# Patient Record
Sex: FEMALE | Race: WHITE | NOT HISPANIC OR LATINO
[De-identification: names, ages, dates, MRNs, and addresses within clinical notes are randomized per-mention and may not be internally consistent; named-entity substitution may affect disease eponyms.]

---

## 2019-09-04 PROBLEM — Z00.00 ENCOUNTER FOR PREVENTIVE HEALTH EXAMINATION: Status: ACTIVE | Noted: 2019-09-04

## 2019-09-07 ENCOUNTER — APPOINTMENT (OUTPATIENT)
Dept: ORTHOPEDIC SURGERY | Facility: CLINIC | Age: 79
End: 2019-09-07
Payer: MEDICARE

## 2019-09-07 VITALS
DIASTOLIC BLOOD PRESSURE: 88 MMHG | SYSTOLIC BLOOD PRESSURE: 155 MMHG | WEIGHT: 194 LBS | HEART RATE: 61 BPM | BODY MASS INDEX: 29.4 KG/M2 | HEIGHT: 68 IN

## 2019-09-07 DIAGNOSIS — Z86.39 PERSONAL HISTORY OF OTHER ENDOCRINE, NUTRITIONAL AND METABOLIC DISEASE: ICD-10-CM

## 2019-09-07 DIAGNOSIS — Z87.39 PERSONAL HISTORY OF OTHER DISEASES OF THE MUSCULOSKELETAL SYSTEM AND CONNECTIVE TISSUE: ICD-10-CM

## 2019-09-07 DIAGNOSIS — M17.12 UNILATERAL PRIMARY OSTEOARTHRITIS, LEFT KNEE: ICD-10-CM

## 2019-09-07 DIAGNOSIS — Z86.2 PERSONAL HISTORY OF DISEASES OF THE BLOOD AND BLOOD-FORMING ORGANS AND CERTAIN DISORDERS INVOLVING THE IMMUNE MECHANISM: ICD-10-CM

## 2019-09-07 DIAGNOSIS — M17.11 UNILATERAL PRIMARY OSTEOARTHRITIS, RIGHT KNEE: ICD-10-CM

## 2019-09-07 DIAGNOSIS — Z86.718 PERSONAL HISTORY OF OTHER VENOUS THROMBOSIS AND EMBOLISM: ICD-10-CM

## 2019-09-07 DIAGNOSIS — Z78.9 OTHER SPECIFIED HEALTH STATUS: ICD-10-CM

## 2019-09-07 DIAGNOSIS — Z86.79 PERSONAL HISTORY OF OTHER DISEASES OF THE CIRCULATORY SYSTEM: ICD-10-CM

## 2019-09-07 PROCEDURE — 73562 X-RAY EXAM OF KNEE 3: CPT | Mod: 50

## 2019-09-07 PROCEDURE — 99204 OFFICE O/P NEW MOD 45 MIN: CPT

## 2019-09-07 RX ORDER — LOTEPREDNOL ETABONATE 10 MG/ML
1 SUSPENSION TOPICAL
Qty: 3 | Refills: 0 | Status: ACTIVE | COMMUNITY
Start: 2019-04-30

## 2019-09-07 RX ORDER — BROMFENAC 0.76 MG/ML
0.07 SOLUTION/ DROPS OPHTHALMIC
Qty: 5 | Refills: 0 | Status: ACTIVE | COMMUNITY
Start: 2019-04-30

## 2019-09-07 RX ORDER — BESIFLOXACIN 6 MG/ML
0.6 SUSPENSION OPHTHALMIC
Qty: 5 | Refills: 0 | Status: ACTIVE | COMMUNITY
Start: 2019-04-30

## 2019-09-07 RX ORDER — OMEPRAZOLE 20 MG/1
20 CAPSULE, DELAYED RELEASE ORAL
Qty: 90 | Refills: 0 | Status: ACTIVE | COMMUNITY
Start: 2019-04-09

## 2019-09-07 RX ORDER — BUTALBITAL, ACETAMINOPHEN AND CAFFEINE 325; 50; 40 MG/1; MG/1; MG/1
50-325-40 TABLET ORAL
Qty: 25 | Refills: 0 | Status: ACTIVE | COMMUNITY
Start: 2019-08-20

## 2019-09-07 RX ORDER — ROSUVASTATIN CALCIUM 10 MG/1
10 TABLET, FILM COATED ORAL
Qty: 36 | Refills: 0 | Status: ACTIVE | COMMUNITY
Start: 2019-06-24

## 2019-09-07 RX ORDER — WARFARIN 7.5 MG/1
7.5 TABLET ORAL
Qty: 1 | Refills: 0 | Status: ACTIVE | COMMUNITY
Start: 2019-08-20

## 2019-09-07 RX ORDER — ROSUVASTATIN CALCIUM 5 MG/1
5 TABLET, FILM COATED ORAL
Qty: 36 | Refills: 0 | Status: ACTIVE | COMMUNITY
Start: 2019-05-23

## 2019-09-07 RX ORDER — METOPROLOL SUCCINATE 25 MG/1
25 TABLET, EXTENDED RELEASE ORAL
Qty: 180 | Refills: 0 | Status: ACTIVE | COMMUNITY
Start: 2019-03-15

## 2019-09-07 RX ORDER — ASPIRIN ENTERIC COATED TABLETS 81 MG 81 MG/1
81 TABLET, DELAYED RELEASE ORAL
Qty: 30 | Refills: 0 | Status: ACTIVE | COMMUNITY
Start: 2019-08-19

## 2019-09-07 RX ORDER — WARFARIN 6 MG/1
6 TABLET ORAL
Qty: 90 | Refills: 0 | Status: ACTIVE | COMMUNITY
Start: 2019-03-19

## 2019-09-07 RX ORDER — PNV NO.95/FERROUS FUM/FOLIC AC 28MG-0.8MG
TABLET ORAL
Refills: 0 | Status: ACTIVE | COMMUNITY

## 2019-09-07 RX ORDER — TROPICAMIDE 10 MG/ML
1 SOLUTION/ DROPS OPHTHALMIC
Qty: 15 | Refills: 0 | Status: ACTIVE | COMMUNITY
Start: 2019-05-28

## 2019-09-24 ENCOUNTER — OUTPATIENT (OUTPATIENT)
Dept: OUTPATIENT SERVICES | Facility: HOSPITAL | Age: 79
LOS: 1 days | End: 2019-09-24
Payer: MEDICARE

## 2019-09-24 VITALS
HEIGHT: 67 IN | DIASTOLIC BLOOD PRESSURE: 103 MMHG | RESPIRATION RATE: 18 BRPM | SYSTOLIC BLOOD PRESSURE: 134 MMHG | TEMPERATURE: 98 F | OXYGEN SATURATION: 98 % | HEART RATE: 85 BPM | WEIGHT: 194.01 LBS

## 2019-09-24 DIAGNOSIS — Z98.890 OTHER SPECIFIED POSTPROCEDURAL STATES: Chronic | ICD-10-CM

## 2019-09-24 DIAGNOSIS — M17.11 UNILATERAL PRIMARY OSTEOARTHRITIS, RIGHT KNEE: ICD-10-CM

## 2019-09-24 DIAGNOSIS — Z01.818 ENCOUNTER FOR OTHER PREPROCEDURAL EXAMINATION: ICD-10-CM

## 2019-09-24 LAB
ALBUMIN SERPL ELPH-MCNC: 3.8 G/DL — SIGNIFICANT CHANGE UP (ref 3.3–5)
ALP SERPL-CCNC: 65 U/L — SIGNIFICANT CHANGE UP (ref 30–120)
ALT FLD-CCNC: 37 U/L DA — SIGNIFICANT CHANGE UP (ref 10–60)
ANION GAP SERPL CALC-SCNC: 5 MMOL/L — SIGNIFICANT CHANGE UP (ref 5–17)
APTT BLD: 48.5 SEC — HIGH (ref 28.5–37)
AST SERPL-CCNC: 33 U/L — SIGNIFICANT CHANGE UP (ref 10–40)
BILIRUB SERPL-MCNC: 0.4 MG/DL — SIGNIFICANT CHANGE UP (ref 0.2–1.2)
BLD GP AB SCN SERPL QL: SIGNIFICANT CHANGE UP
BUN SERPL-MCNC: 16 MG/DL — SIGNIFICANT CHANGE UP (ref 7–23)
CALCIUM SERPL-MCNC: 10 MG/DL — SIGNIFICANT CHANGE UP (ref 8.4–10.5)
CHLORIDE SERPL-SCNC: 104 MMOL/L — SIGNIFICANT CHANGE UP (ref 96–108)
CO2 SERPL-SCNC: 29 MMOL/L — SIGNIFICANT CHANGE UP (ref 22–31)
CREAT SERPL-MCNC: 1.07 MG/DL — SIGNIFICANT CHANGE UP (ref 0.5–1.3)
GLUCOSE SERPL-MCNC: 114 MG/DL — HIGH (ref 70–99)
HCT VFR BLD CALC: 44.5 % — SIGNIFICANT CHANGE UP (ref 34.5–45)
HGB BLD-MCNC: 14.7 G/DL — SIGNIFICANT CHANGE UP (ref 11.5–15.5)
INR BLD: 2.54 RATIO — HIGH (ref 0.88–1.16)
MCHC RBC-ENTMCNC: 31.1 PG — SIGNIFICANT CHANGE UP (ref 27–34)
MCHC RBC-ENTMCNC: 33 GM/DL — SIGNIFICANT CHANGE UP (ref 32–36)
MCV RBC AUTO: 94.1 FL — SIGNIFICANT CHANGE UP (ref 80–100)
MRSA PCR RESULT.: SIGNIFICANT CHANGE UP
NRBC # BLD: 0 /100 WBCS — SIGNIFICANT CHANGE UP (ref 0–0)
PLATELET # BLD AUTO: 255 K/UL — SIGNIFICANT CHANGE UP (ref 150–400)
POTASSIUM SERPL-MCNC: 4.3 MMOL/L — SIGNIFICANT CHANGE UP (ref 3.5–5.3)
POTASSIUM SERPL-SCNC: 4.3 MMOL/L — SIGNIFICANT CHANGE UP (ref 3.5–5.3)
PROT SERPL-MCNC: 8 G/DL — SIGNIFICANT CHANGE UP (ref 6–8.3)
PROTHROM AB SERPL-ACNC: 28.5 SEC — HIGH (ref 10–12.9)
RBC # BLD: 4.73 M/UL — SIGNIFICANT CHANGE UP (ref 3.8–5.2)
RBC # FLD: 14.2 % — SIGNIFICANT CHANGE UP (ref 10.3–14.5)
S AUREUS DNA NOSE QL NAA+PROBE: SIGNIFICANT CHANGE UP
SODIUM SERPL-SCNC: 138 MMOL/L — SIGNIFICANT CHANGE UP (ref 135–145)
WBC # BLD: 4.67 K/UL — SIGNIFICANT CHANGE UP (ref 3.8–10.5)
WBC # FLD AUTO: 4.67 K/UL — SIGNIFICANT CHANGE UP (ref 3.8–10.5)

## 2019-09-24 PROCEDURE — 93005 ELECTROCARDIOGRAM TRACING: CPT

## 2019-09-24 PROCEDURE — 80053 COMPREHEN METABOLIC PANEL: CPT

## 2019-09-24 PROCEDURE — 85027 COMPLETE CBC AUTOMATED: CPT

## 2019-09-24 PROCEDURE — 85610 PROTHROMBIN TIME: CPT

## 2019-09-24 PROCEDURE — G0463: CPT

## 2019-09-24 PROCEDURE — 36415 COLL VENOUS BLD VENIPUNCTURE: CPT

## 2019-09-24 PROCEDURE — 85730 THROMBOPLASTIN TIME PARTIAL: CPT

## 2019-09-24 PROCEDURE — 87640 STAPH A DNA AMP PROBE: CPT

## 2019-09-24 PROCEDURE — 86850 RBC ANTIBODY SCREEN: CPT

## 2019-09-24 PROCEDURE — 93010 ELECTROCARDIOGRAM REPORT: CPT

## 2019-09-24 PROCEDURE — 87641 MR-STAPH DNA AMP PROBE: CPT

## 2019-09-24 PROCEDURE — 86900 BLOOD TYPING SEROLOGIC ABO: CPT

## 2019-09-24 PROCEDURE — 86901 BLOOD TYPING SEROLOGIC RH(D): CPT

## 2019-09-24 NOTE — H&P PST ADULT - NSICDXPROBLEM_GEN_ALL_CORE_FT
PROBLEM DIAGNOSES  Problem: Arthritis of right knee  Assessment and Plan: right total knee replacement  10/14/19  medical clearance/hematology clearance/vascular clearance  patient to stop coumadin as per  hematologist

## 2019-09-24 NOTE — H&P PST ADULT - NSICDXFAMILYHX_GEN_ALL_CORE_FT
FAMILY HISTORY:  Family history of malignant melanoma of skin, mother  Family history of seizures, father

## 2019-09-24 NOTE — H&P PST ADULT - NSICDXPASTMEDICALHX_GEN_ALL_CORE_FT
PAST MEDICAL HISTORY:  Breast cancer, female s/p lumpectomy chemo and radiation    High cholesterol     History of DVT of lower extremity RLE    History of hay fever     History of pulmonary embolism 2016    Hypertension

## 2019-09-24 NOTE — H&P PST ADULT - HISTORY OF PRESENT ILLNESS
78 y/o female with right knee pain for more than a year. Failed conservative therapy Progressively getting worst with stiffness and pain with ADLs. Takes tylenol as needed with some relief

## 2019-09-24 NOTE — H&P PST ADULT - MUSCULOSKELETAL
No joint pain, swelling or deformity; no limitation of movement details… detailed exam right knee/decreased ROM due to pain

## 2019-09-27 ENCOUNTER — OUTPATIENT (OUTPATIENT)
Dept: OUTPATIENT SERVICES | Facility: HOSPITAL | Age: 79
LOS: 1 days | Discharge: HOME | End: 2019-09-27

## 2019-09-27 DIAGNOSIS — I26.99 OTHER PULMONARY EMBOLISM WITHOUT ACUTE COR PULMONALE: ICD-10-CM

## 2019-09-27 DIAGNOSIS — Z98.890 OTHER SPECIFIED POSTPROCEDURAL STATES: Chronic | ICD-10-CM

## 2019-09-28 PROBLEM — Z86.718 PERSONAL HISTORY OF OTHER VENOUS THROMBOSIS AND EMBOLISM: Chronic | Status: ACTIVE | Noted: 2019-09-24

## 2019-09-28 PROBLEM — I10 ESSENTIAL (PRIMARY) HYPERTENSION: Chronic | Status: ACTIVE | Noted: 2019-09-24

## 2019-09-28 PROBLEM — Z86.711 PERSONAL HISTORY OF PULMONARY EMBOLISM: Chronic | Status: ACTIVE | Noted: 2019-09-24

## 2019-09-28 PROBLEM — C50.919 MALIGNANT NEOPLASM OF UNSPECIFIED SITE OF UNSPECIFIED FEMALE BREAST: Chronic | Status: ACTIVE | Noted: 2019-09-24

## 2019-09-28 PROBLEM — E78.00 PURE HYPERCHOLESTEROLEMIA, UNSPECIFIED: Chronic | Status: ACTIVE | Noted: 2019-09-24

## 2019-09-28 PROBLEM — Z87.09 PERSONAL HISTORY OF OTHER DISEASES OF THE RESPIRATORY SYSTEM: Chronic | Status: ACTIVE | Noted: 2019-09-24

## 2019-10-02 ENCOUNTER — APPOINTMENT (OUTPATIENT)
Dept: HEMATOLOGY ONCOLOGY | Facility: CLINIC | Age: 79
End: 2019-10-02
Payer: MEDICARE

## 2019-10-02 VITALS
TEMPERATURE: 97.9 F | WEIGHT: 194.89 LBS | HEIGHT: 68 IN | OXYGEN SATURATION: 97 % | DIASTOLIC BLOOD PRESSURE: 96 MMHG | SYSTOLIC BLOOD PRESSURE: 150 MMHG | RESPIRATION RATE: 18 BRPM | BODY MASS INDEX: 29.54 KG/M2 | HEART RATE: 62 BPM

## 2019-10-02 DIAGNOSIS — Z85.3 PERSONAL HISTORY OF MALIGNANT NEOPLASM OF BREAST: ICD-10-CM

## 2019-10-02 DIAGNOSIS — Z85.9 PERSONAL HISTORY OF MALIGNANT NEOPLASM, UNSPECIFIED: ICD-10-CM

## 2019-10-02 DIAGNOSIS — Z80.9 FAMILY HISTORY OF MALIGNANT NEOPLASM, UNSPECIFIED: ICD-10-CM

## 2019-10-02 DIAGNOSIS — Z92.89 PERSONAL HISTORY OF OTHER MEDICAL TREATMENT: ICD-10-CM

## 2019-10-02 DIAGNOSIS — Z86.69 PERSONAL HISTORY OF OTHER DISEASES OF THE NERVOUS SYSTEM AND SENSE ORGANS: ICD-10-CM

## 2019-10-02 DIAGNOSIS — I82.401 ACUTE EMBOLISM AND THROMBOSIS OF UNSPECIFIED DEEP VEINS OF RIGHT LOWER EXTREMITY: ICD-10-CM

## 2019-10-02 DIAGNOSIS — Z86.711 PERSONAL HISTORY OF PULMONARY EMBOLISM: ICD-10-CM

## 2019-10-02 PROCEDURE — 99205 OFFICE O/P NEW HI 60 MIN: CPT

## 2019-10-02 NOTE — REASON FOR VISIT
[Initial Consultation] : an initial consultation for [Other: _____] : [unfilled] [FreeTextEntry2] : h/o DVT/PE

## 2019-10-02 NOTE — REVIEW OF SYSTEMS
[Patient Intake Form Reviewed] : Patient intake form was reviewed [Joint Pain] : joint pain [Negative] : Allergic/Immunologic

## 2019-10-02 NOTE — HISTORY OF PRESENT ILLNESS
[de-identified] : 2/2015-Had RLE DVT/PE-unprovoked-treated with Coumadin since that time. Sees pulmonologist every 3 months (Dr. Becerra) who monitors her Coumadin.\rosmery Saw Dr. Galloway (vascular) this past week, and had LE US which was reportedly negative for DVT.\rosmery Has plans for right total knee replacement 10/14/19.\par No complaints of chest pain, shortness of breath, calf pain.\par Sees oncologist Dr. Campbell ([place]) yearly for h/o breast cancer.\par Father has h/o pulmonary embolism postoperatively.

## 2019-10-02 NOTE — CONSULT LETTER
[Dear  ___] : Dear  [unfilled], [Consult Letter:] : I had the pleasure of evaluating your patient, [unfilled]. [Please see my note below.] : Please see my note below. [Consult Closing:] : Thank you very much for allowing me to participate in the care of this patient.  If you have any questions, please do not hesitate to contact me. [Sincerely,] : Sincerely, [DrMayank  ___] : Dr. GRIFFITH [DrMayank ___] : Dr. GRIFFITH [FreeTextEntry3] : Jeri Barbosa M.D.

## 2019-10-02 NOTE — ASSESSMENT
[FreeTextEntry1] : Patient with history of seemingly unprovoked DVT/pulmonary embolism years ago, maintained on anticoagulation with Coumadin.Have asked for report of recent lower extremity ultrasound(Dr. Galloway).\par Discussed hypercoagulable screening evaluation to further define her thrombotic risks. Patient declines pursuing hypercoagulable evaluation at this time. She stated she is agreeable to continue indefinite anticoagulation at this point. Potential bleeding risk/alternatives of anticoagulation reviewed.\par Patient will hold her Coumadin prior to knee surgery. Following surgery, once cleared by surgeon to resume anticoagulation, recommend bridging of low molecular weight heparin to Coumadin; and as early ambulation as possible.\par Patient was given the opportunity to ask questions. Her questions have been answered to her apparent satisfaction at this time.

## 2019-10-08 ENCOUNTER — OTHER (OUTPATIENT)
Age: 79
End: 2019-10-08

## 2019-10-10 RX ORDER — WARFARIN SODIUM 2.5 MG/1
1 TABLET ORAL
Qty: 0 | Refills: 0 | DISCHARGE

## 2019-10-10 NOTE — PROGRESS NOTE ADULT - SUBJECTIVE AND OBJECTIVE BOX
Admission medication reconciliation/Presurgical evaluation:    Allergies:  Floxin: Hives, Rash  Cipro: rash, possibly photosensitivity  Biaxin: lip swelling  sulfa drugs: doesn’t remember    Intolerance:  Percocet: nausea/vomiting    Home Medications:   · 	rosuvastatin 10 mg once a day  · 	metoprolol tartrate 25 mg 2 times a day  · 	warfarin 6 mg once a day, 6 days per week (no Sat)  · 	omeprazole 20 mg once a day  · 	loratadine 10 mg once a day  · 	Tylenol 1000 mg every 6 hours, As Needed  · 	fluticasone 0.5 mg/2 mL inhalation suspension PRN   ·	Co Q 10 Qday    PAST MEDICAL HISTORY:  Breast cancer, female s/p lumpectomy chemo and radiation  High cholesterol   History of DVT of lower extremity RLE  History of hay fever   History of pulmonary embolism 2015  Hypertension    PAST SURGICAL HISTORY:  S/P lumpectomy, right breast.     FAMILY HISTORY:  Father, fatal PE after surgery    PST values of interest:  PT/INR 28.5/2.54 (­Þ warfarin)  SCr 1.07 mg/dL  LFTs WNL

## 2019-10-10 NOTE — PROGRESS NOTE ADULT - ASSESSMENT
Presurgical evaluation:  1.	IV TXA OK per protocol; clinician decision if topical TXA instead  2.	I spoke to the patient’s pulmonologist (Hernan) and hematologist (Chelsey). Patient will interrupt warfarin 4 days prior to surgery. No Enoxaparin bridge preop.   3.	Acetaminophen 1G IV q6h x4 then Acetaminophen 1G PO q12h (concurrent warfarin). Avoid Meloxicam due to Enoxaparin/warfarin bridge postop.  4.	Per hematology: Enoxaparin 30mg q12h POD1 then Enoxaparin 1mg/kg q12h (treatment dose) with warfarin, titrate to therapeutic INR.  5.	Restart Warfarin on POD1 after patient is bridged with Enoxaparin  6.	Please confirm that the patient does NOT take ASA 81mg Qday chronically. It is listed on the only presurgical clearance I have in hand.  7.	Patient would prefer to try Tramadol for PRN breakthrough pain. Percocet has caused N/V in past. Presurgical evaluation:  1.	IV TXA OK per protocol; clinician decision if topical TXA instead  2.	I spoke to the patient’s pulmonologist (Hernan) and hematologist (Chelsey). Patient will interrupt warfarin 4 days prior to surgery. No Enoxaparin bridge preop.   3.	Acetaminophen 1G IV q6h x4 then Acetaminophen 1G PO q12h (concurrent warfarin). Avoid Meloxicam due to Enoxaparin/warfarin bridge postop.  4.	Per hematology: Enoxaparin 30mg q12h POD1 then Enoxaparin 1mg/kg q12h (treatment dose) with warfarin, titrate to therapeutic INR.  5.	Restart Warfarin on POD1 after patient is bridged with Enoxaparin  6.	Please confirm that the patient does NOT take ASA 81mg Qday chronically. It is listed on the only presurgical clearance I have in hand.  7.	Percocet has caused nausea/vomiting in this patient. She requested to try tramadol first but there is a significant drug interaction with warfarin that can cause supratherapeutic INR. Discuss either low dose oxycodone or hydromorphone with patient instead.

## 2019-10-11 ENCOUNTER — CHART COPY (OUTPATIENT)
Age: 79
End: 2019-10-11

## 2019-10-11 RX ORDER — ENOXAPARIN SODIUM 100 MG/ML
40 INJECTION SUBCUTANEOUS
Qty: 10 | Refills: 0 | Status: ACTIVE | COMMUNITY
Start: 2019-10-11 | End: 1900-01-01

## 2019-10-11 NOTE — CHART NOTE - NSCHARTNOTEFT_GEN_A_CORE
Asked to review clearances by Anesthesia, I spoke to both Saud Galloway and Nadja regarding anticoagulation.  Due to personal and family history patient is at increased risk of developing clot but as of date of pre-op doppler had no DVT.  Therefore it is reasonable to provide pre-op prophylactic lovenox bridging (40mg subcutaneous daily today, Saturday and Sunday).   Post operatively can have prophylactic dose lovenox on POD #1 and Full treatment dose lovenox on POD #2 until INR therapeutic.    Surgeon's office notified and will be calling patient to arrange.

## 2019-10-14 ENCOUNTER — RESULT REVIEW (OUTPATIENT)
Age: 79
End: 2019-10-14

## 2019-10-14 ENCOUNTER — APPOINTMENT (OUTPATIENT)
Dept: ORTHOPEDIC SURGERY | Facility: HOSPITAL | Age: 79
End: 2019-10-14

## 2019-10-14 ENCOUNTER — INPATIENT (INPATIENT)
Facility: HOSPITAL | Age: 79
LOS: 1 days | Discharge: NURSING, REHAB OR LONG-TERM CARE | DRG: 470 | End: 2019-10-16
Attending: ORTHOPAEDIC SURGERY | Admitting: ORTHOPAEDIC SURGERY
Payer: MEDICARE

## 2019-10-14 VITALS
RESPIRATION RATE: 15 BRPM | SYSTOLIC BLOOD PRESSURE: 176 MMHG | OXYGEN SATURATION: 99 % | HEIGHT: 68 IN | HEART RATE: 71 BPM | DIASTOLIC BLOOD PRESSURE: 96 MMHG | WEIGHT: 190.26 LBS | TEMPERATURE: 98 F

## 2019-10-14 DIAGNOSIS — M17.11 UNILATERAL PRIMARY OSTEOARTHRITIS, RIGHT KNEE: ICD-10-CM

## 2019-10-14 DIAGNOSIS — Z98.890 OTHER SPECIFIED POSTPROCEDURAL STATES: Chronic | ICD-10-CM

## 2019-10-14 LAB
ANION GAP SERPL CALC-SCNC: 7 MMOL/L — SIGNIFICANT CHANGE UP (ref 5–17)
APTT BLD: 30.8 SEC — SIGNIFICANT CHANGE UP (ref 28.5–37)
BUN SERPL-MCNC: 14 MG/DL — SIGNIFICANT CHANGE UP (ref 7–23)
CALCIUM SERPL-MCNC: 9.1 MG/DL — SIGNIFICANT CHANGE UP (ref 8.4–10.5)
CHLORIDE SERPL-SCNC: 102 MMOL/L — SIGNIFICANT CHANGE UP (ref 96–108)
CO2 SERPL-SCNC: 27 MMOL/L — SIGNIFICANT CHANGE UP (ref 22–31)
CREAT SERPL-MCNC: 0.92 MG/DL — SIGNIFICANT CHANGE UP (ref 0.5–1.3)
GLUCOSE SERPL-MCNC: 140 MG/DL — HIGH (ref 70–99)
HCT VFR BLD CALC: 40.1 % — SIGNIFICANT CHANGE UP (ref 34.5–45)
HGB BLD-MCNC: 13.1 G/DL — SIGNIFICANT CHANGE UP (ref 11.5–15.5)
INR BLD: 1.07 RATIO — SIGNIFICANT CHANGE UP (ref 0.88–1.16)
POTASSIUM SERPL-MCNC: 3.9 MMOL/L — SIGNIFICANT CHANGE UP (ref 3.5–5.3)
POTASSIUM SERPL-SCNC: 3.9 MMOL/L — SIGNIFICANT CHANGE UP (ref 3.5–5.3)
PROTHROM AB SERPL-ACNC: 11.7 SEC — SIGNIFICANT CHANGE UP (ref 10–12.9)
SODIUM SERPL-SCNC: 136 MMOL/L — SIGNIFICANT CHANGE UP (ref 135–145)

## 2019-10-14 PROCEDURE — 99223 1ST HOSP IP/OBS HIGH 75: CPT

## 2019-10-14 PROCEDURE — 88305 TISSUE EXAM BY PATHOLOGIST: CPT | Mod: 26

## 2019-10-14 PROCEDURE — 88311 DECALCIFY TISSUE: CPT | Mod: 26

## 2019-10-14 PROCEDURE — 27447 TOTAL KNEE ARTHROPLASTY: CPT | Mod: RT

## 2019-10-14 PROCEDURE — 99497 ADVNCD CARE PLAN 30 MIN: CPT | Mod: 25

## 2019-10-14 RX ORDER — LORATADINE 10 MG/1
10 TABLET ORAL DAILY
Refills: 0 | Status: DISCONTINUED | OUTPATIENT
Start: 2019-10-14 | End: 2019-10-16

## 2019-10-14 RX ORDER — HYDROMORPHONE HYDROCHLORIDE 2 MG/ML
2 INJECTION INTRAMUSCULAR; INTRAVENOUS; SUBCUTANEOUS
Refills: 0 | Status: DISCONTINUED | OUTPATIENT
Start: 2019-10-14 | End: 2019-10-16

## 2019-10-14 RX ORDER — LORATADINE 10 MG/1
1 TABLET ORAL
Qty: 0 | Refills: 0 | DISCHARGE

## 2019-10-14 RX ORDER — FLUTICASONE PROPIONATE 220 MCG
0 AEROSOL WITH ADAPTER (GRAM) INHALATION
Qty: 0 | Refills: 0 | DISCHARGE

## 2019-10-14 RX ORDER — ENOXAPARIN SODIUM 100 MG/ML
30 INJECTION SUBCUTANEOUS EVERY 12 HOURS
Refills: 0 | Status: COMPLETED | OUTPATIENT
Start: 2019-10-15 | End: 2019-10-15

## 2019-10-14 RX ORDER — ONDANSETRON 8 MG/1
4 TABLET, FILM COATED ORAL ONCE
Refills: 0 | Status: DISCONTINUED | OUTPATIENT
Start: 2019-10-14 | End: 2019-10-14

## 2019-10-14 RX ORDER — ENOXAPARIN SODIUM 100 MG/ML
0 INJECTION SUBCUTANEOUS
Qty: 0 | Refills: 0 | DISCHARGE
Start: 2019-10-14

## 2019-10-14 RX ORDER — POLYETHYLENE GLYCOL 3350 17 G/17G
17 POWDER, FOR SOLUTION ORAL DAILY
Refills: 0 | Status: DISCONTINUED | OUTPATIENT
Start: 2019-10-14 | End: 2019-10-16

## 2019-10-14 RX ORDER — CHLORHEXIDINE GLUCONATE 213 G/1000ML
1 SOLUTION TOPICAL ONCE
Refills: 0 | Status: COMPLETED | OUTPATIENT
Start: 2019-10-14 | End: 2019-10-14

## 2019-10-14 RX ORDER — ACETAMINOPHEN 500 MG
1000 TABLET ORAL ONCE
Refills: 0 | Status: COMPLETED | OUTPATIENT
Start: 2019-10-14 | End: 2019-10-14

## 2019-10-14 RX ORDER — HYDROMORPHONE HYDROCHLORIDE 2 MG/ML
4 INJECTION INTRAMUSCULAR; INTRAVENOUS; SUBCUTANEOUS
Refills: 0 | Status: DISCONTINUED | OUTPATIENT
Start: 2019-10-14 | End: 2019-10-16

## 2019-10-14 RX ORDER — ACETAMINOPHEN 500 MG
1000 TABLET ORAL EVERY 6 HOURS
Refills: 0 | Status: COMPLETED | OUTPATIENT
Start: 2019-10-14 | End: 2019-10-15

## 2019-10-14 RX ORDER — SENNA PLUS 8.6 MG/1
2 TABLET ORAL AT BEDTIME
Refills: 0 | Status: DISCONTINUED | OUTPATIENT
Start: 2019-10-14 | End: 2019-10-16

## 2019-10-14 RX ORDER — DOCUSATE SODIUM 100 MG
100 CAPSULE ORAL THREE TIMES A DAY
Refills: 0 | Status: DISCONTINUED | OUTPATIENT
Start: 2019-10-14 | End: 2019-10-16

## 2019-10-14 RX ORDER — ATORVASTATIN CALCIUM 80 MG/1
40 TABLET, FILM COATED ORAL AT BEDTIME
Refills: 0 | Status: DISCONTINUED | OUTPATIENT
Start: 2019-10-14 | End: 2019-10-16

## 2019-10-14 RX ORDER — WARFARIN SODIUM 2.5 MG/1
6 TABLET ORAL ONCE
Refills: 0 | Status: COMPLETED | OUTPATIENT
Start: 2019-10-15 | End: 2019-10-15

## 2019-10-14 RX ORDER — OXYCODONE HYDROCHLORIDE 5 MG/1
5 TABLET ORAL ONCE
Refills: 0 | Status: DISCONTINUED | OUTPATIENT
Start: 2019-10-14 | End: 2019-10-14

## 2019-10-14 RX ORDER — WARFARIN SODIUM 2.5 MG/1
1 TABLET ORAL
Qty: 0 | Refills: 0 | DISCHARGE

## 2019-10-14 RX ORDER — CEFAZOLIN SODIUM 1 G
2000 VIAL (EA) INJECTION ONCE
Refills: 0 | Status: COMPLETED | OUTPATIENT
Start: 2019-10-14 | End: 2019-10-14

## 2019-10-14 RX ORDER — HYDROMORPHONE HYDROCHLORIDE 2 MG/ML
0.5 INJECTION INTRAMUSCULAR; INTRAVENOUS; SUBCUTANEOUS
Refills: 0 | Status: DISCONTINUED | OUTPATIENT
Start: 2019-10-14 | End: 2019-10-16

## 2019-10-14 RX ORDER — PANTOPRAZOLE SODIUM 20 MG/1
40 TABLET, DELAYED RELEASE ORAL
Refills: 0 | Status: DISCONTINUED | OUTPATIENT
Start: 2019-10-14 | End: 2019-10-16

## 2019-10-14 RX ORDER — PANTOPRAZOLE SODIUM 20 MG/1
40 TABLET, DELAYED RELEASE ORAL
Refills: 0 | Status: DISCONTINUED | OUTPATIENT
Start: 2019-10-14 | End: 2019-10-14

## 2019-10-14 RX ORDER — ACETAMINOPHEN 500 MG
1000 TABLET ORAL EVERY 12 HOURS
Refills: 0 | Status: DISCONTINUED | OUTPATIENT
Start: 2019-10-15 | End: 2019-10-16

## 2019-10-14 RX ORDER — APREPITANT 80 MG/1
40 CAPSULE ORAL ONCE
Refills: 0 | Status: COMPLETED | OUTPATIENT
Start: 2019-10-14 | End: 2019-10-14

## 2019-10-14 RX ORDER — ONDANSETRON 8 MG/1
4 TABLET, FILM COATED ORAL EVERY 6 HOURS
Refills: 0 | Status: DISCONTINUED | OUTPATIENT
Start: 2019-10-14 | End: 2019-10-16

## 2019-10-14 RX ORDER — METOPROLOL TARTRATE 50 MG
1 TABLET ORAL
Qty: 0 | Refills: 0 | DISCHARGE

## 2019-10-14 RX ORDER — ROSUVASTATIN CALCIUM 5 MG/1
1 TABLET ORAL
Qty: 0 | Refills: 0 | DISCHARGE

## 2019-10-14 RX ORDER — WARFARIN SODIUM 2.5 MG/1
6 TABLET ORAL ONCE
Refills: 0 | Status: COMPLETED | OUTPATIENT
Start: 2019-10-14 | End: 2019-10-14

## 2019-10-14 RX ORDER — HYDROMORPHONE HYDROCHLORIDE 2 MG/ML
0.5 INJECTION INTRAMUSCULAR; INTRAVENOUS; SUBCUTANEOUS
Refills: 0 | Status: DISCONTINUED | OUTPATIENT
Start: 2019-10-14 | End: 2019-10-14

## 2019-10-14 RX ORDER — METOPROLOL TARTRATE 50 MG
25 TABLET ORAL
Refills: 0 | Status: DISCONTINUED | OUTPATIENT
Start: 2019-10-14 | End: 2019-10-16

## 2019-10-14 RX ORDER — MAGNESIUM HYDROXIDE 400 MG/1
30 TABLET, CHEWABLE ORAL DAILY
Refills: 0 | Status: DISCONTINUED | OUTPATIENT
Start: 2019-10-14 | End: 2019-10-16

## 2019-10-14 RX ORDER — CEFAZOLIN SODIUM 1 G
2000 VIAL (EA) INJECTION EVERY 8 HOURS
Refills: 0 | Status: COMPLETED | OUTPATIENT
Start: 2019-10-14 | End: 2019-10-15

## 2019-10-14 RX ORDER — SODIUM CHLORIDE 9 MG/ML
1000 INJECTION, SOLUTION INTRAVENOUS
Refills: 0 | Status: DISCONTINUED | OUTPATIENT
Start: 2019-10-14 | End: 2019-10-16

## 2019-10-14 RX ADMIN — APREPITANT 40 MILLIGRAM(S): 80 CAPSULE ORAL at 11:00

## 2019-10-14 RX ADMIN — Medication 400 MILLIGRAM(S): at 20:10

## 2019-10-14 RX ADMIN — WARFARIN SODIUM 6 MILLIGRAM(S): 2.5 TABLET ORAL at 21:45

## 2019-10-14 RX ADMIN — Medication 100 MILLIGRAM(S): at 21:45

## 2019-10-14 RX ADMIN — Medication 100 MILLIGRAM(S): at 21:47

## 2019-10-14 RX ADMIN — HYDROMORPHONE HYDROCHLORIDE 0.5 MILLIGRAM(S): 2 INJECTION INTRAMUSCULAR; INTRAVENOUS; SUBCUTANEOUS at 17:04

## 2019-10-14 RX ADMIN — CHLORHEXIDINE GLUCONATE 1 APPLICATION(S): 213 SOLUTION TOPICAL at 11:00

## 2019-10-14 RX ADMIN — ATORVASTATIN CALCIUM 40 MILLIGRAM(S): 80 TABLET, FILM COATED ORAL at 21:47

## 2019-10-14 RX ADMIN — SENNA PLUS 2 TABLET(S): 8.6 TABLET ORAL at 21:47

## 2019-10-14 RX ADMIN — Medication 1000 MILLIGRAM(S): at 20:10

## 2019-10-14 NOTE — CONSULT NOTE ADULT - SUBJECTIVE AND OBJECTIVE BOX
80 yo F with hx of Breast cancer, female s/p lumpectomy chemo and radiation, dyslipidemia, DVT of RLE, PE in 2015,HTN, admitted to  for surgical evaluation of right knee. Now underwent right Total Knee replacement      Baseline Hx      Perioperative: Uneventful. estimated bloss loss 150cc        PAST MEDICAL & SURGICAL HISTORY:  History of DVT of lower extremity: RLE  History of pulmonary embolism: 2016  Breast cancer, female: s/p lumpectomy chemo and radiation  History of hay fever  High cholesterol  Hypertension  S/P lumpectomy, right breast      FAMILY HISTORY:  Family history of seizures: father  Family history of malignant melanoma of skin: mother      Allergies    Biaxin (Other)  Cipro (Other)  Floxin (Hives; Rash)  sulfa drugs (Other)    Intolerances    Percocet 5/325 (Vomiting; Nausea)      Review of Systems:  General:denies fever chills, headache, weakness  HEENT: denies blurry vision,diffculty swallowing,  Cardiovascular: denies chest pain  ,palpitatins  Pulmonary:denies shortness of breath, cough, wheezing  Gastrointestinal: denies abdominal pain, constipation, diarrhra  : denies hematuria, dysuria  Neurological: denies weakness, numbness , tingling, dizziness  skin: denies skin rash  Psychiatrical: denies mood disturbances    Home Medications:  fluticasone 0.5 mg/2 mL inhalation suspension:  (14 Oct 2019 10:54)  loratadine 10 mg oral tablet: 1 tab(s) orally once a day (14 Oct 2019 10:54)  Lovenox 40 mg/0.4 mL injectable solution: milligram(s) injectable once a day (14 Oct 2019 10:54)  metoprolol tartrate 25 mg oral tablet: 1 tab(s) orally 2 times a day (14 Oct 2019 10:54)  omeprazole 20 mg oral delayed release capsule: 1 cap(s) orally once a day (14 Oct 2019 10:54)  rosuvastatin 10 mg oral tablet: 1 tab(s) orally once a day (14 Oct 2019 10:54)  Tylenol 500 mg oral tablet: 2 tab(s) orally every 6 hours, As Needed (14 Oct 2019 10:54)  warfarin 6 mg oral tablet: 1 tab(s) orally once a day for 6 days/week (not on Saturday) (14 Oct 2019 10:54)        Objective:  Vitals  T(C): 36.4 (10-14-19 @ 10:19), Max: 36.4 (10-14-19 @ 10:19)  HR: 75 (10-14-19 @ 13:30) (71 - 75)  BP: 166/79 (10-14-19 @ 13:30) (166/79 - 187/87)  RR: 20 (10-14-19 @ 13:30) (15 - 20)  SpO2: 95% (10-14-19 @ 13:30) (95% - 99%)    Physical Exam:  General: comfortable, no acute distress, well nourished  HEENT: no LAD, trachea midline  Cardiovascular: normal s1s2, no mrg  Pulmonary: CTA Bilaterally, no wheezing , rhonchi  Gastrointestinal: soft non tender non distended, no masses felt  Muscloskeletal: no lower extremity edema  Neurological: CN II-12 intact. No focal weakness  Psychiatrical: normal mood, cooperative    Labs:              PT/INR - ( 14 Oct 2019 10:30 )   PT: 11.7 sec;   INR: 1.07 ratio         PTT - ( 14 Oct 2019 10:30 )  PTT:30.8 sec      Active Medications  MEDICATIONS  (STANDING):    MEDICATIONS  (PRN):  HYDROmorphone  Injectable 0.5 milliGRAM(s) IV Push every 10 minutes PRN Severe Pain (7 - 10)  ondansetron Injectable 4 milliGRAM(s) IV Push once PRN Nausea and/or Vomiting  oxyCODONE    IR 5 milliGRAM(s) Oral once PRN Moderate Pain (4 - 6) 80 yo F with hx of Breast cancer, female s/p lumpectomy chemo and radiation, dyslipidemia, DVT of RLE, PE in 2015,HTN, admitted to  for surgical evaluation of right knee. Now underwent right Total Knee replacement    Perioperative: Uneventful. estimated bloss loss 150cc. underwent adductor nerve block        PAST MEDICAL & SURGICAL HISTORY:  History of DVT of lower extremity: RLE  History of pulmonary embolism: 2016  Breast cancer, female: s/p lumpectomy chemo and radiation  History of hay fever  High cholesterol  Hypertension  S/P lumpectomy, right breast      FAMILY HISTORY:  Family history of seizures: father  Family history of malignant melanoma of skin: mother      Allergies    Biaxin (Other)  Cipro (Other)  Floxin (Hives; Rash)  sulfa drugs (Other)    Intolerances    Percocet 5/325 (Vomiting; Nausea)      Review of Systems:  General:denies fever chills, headache, weakness  HEENT: denies blurry vision,diffculty swallowing,  Cardiovascular: denies chest pain  ,palpitatins  Pulmonary:denies shortness of breath, cough, wheezing  Gastrointestinal: denies abdominal pain, constipation, diarrhra  : denies hematuria, dysuria  Neurological: denies weakness, numbness , tingling, dizziness  skin: denies skin rash  Psychiatrical: denies mood disturbances    Home Medications:  fluticasone 0.5 mg/2 mL inhalation suspension:  (14 Oct 2019 10:54)  loratadine 10 mg oral tablet: 1 tab(s) orally once a day (14 Oct 2019 10:54)  Lovenox 40 mg/0.4 mL injectable solution: milligram(s) injectable once a day (14 Oct 2019 10:54)  metoprolol tartrate 25 mg oral tablet: 1 tab(s) orally 2 times a day (14 Oct 2019 10:54)  omeprazole 20 mg oral delayed release capsule: 1 cap(s) orally once a day (14 Oct 2019 10:54)  rosuvastatin 10 mg oral tablet: 1 tab(s) orally once a day (14 Oct 2019 10:54)  Tylenol 500 mg oral tablet: 2 tab(s) orally every 6 hours, As Needed (14 Oct 2019 10:54)  warfarin 6 mg oral tablet: 1 tab(s) orally once a day for 6 days/week (not on Saturday) (14 Oct 2019 10:54)        Objective:  Vitals  T(C): 36.4 (10-14-19 @ 10:19), Max: 36.4 (10-14-19 @ 10:19)  HR: 75 (10-14-19 @ 13:30) (71 - 75)  BP: 166/79 (10-14-19 @ 13:30) (166/79 - 187/87)  RR: 20 (10-14-19 @ 13:30) (15 - 20)  SpO2: 95% (10-14-19 @ 13:30) (95% - 99%)    Physical Exam:  General: comfortable, no acute distress, well nourished  HEENT: no LAD, trachea midline  Cardiovascular: normal s1s2, no mrg  Pulmonary: CTA Bilaterally, no wheezing , rhonchi  Gastrointestinal: soft non tender non distended, no masses felt  Muscloskeletal: no lower extremity edema  Neurological: CN II-12 intact. No focal weakness  Psychiatrical: normal mood, cooperative    Labs:              PT/INR - ( 14 Oct 2019 10:30 )   PT: 11.7 sec;   INR: 1.07 ratio         PTT - ( 14 Oct 2019 10:30 )  PTT:30.8 sec      Active Medications  MEDICATIONS  (STANDING):    MEDICATIONS  (PRN):  HYDROmorphone  Injectable 0.5 milliGRAM(s) IV Push every 10 minutes PRN Severe Pain (7 - 10)  ondansetron Injectable 4 milliGRAM(s) IV Push once PRN Nausea and/or Vomiting  oxyCODONE    IR 5 milliGRAM(s) Oral once PRN Moderate Pain (4 - 6)

## 2019-10-14 NOTE — PHYSICAL THERAPY INITIAL EVALUATION ADULT - CRITERIA FOR SKILLED THERAPEUTIC INTERVENTIONS
anticipated equipment needs at discharge/anticipated discharge recommendation/functional limitations in following categories/impairments found

## 2019-10-14 NOTE — PHYSICAL THERAPY INITIAL EVALUATION ADULT - ADDITIONAL COMMENTS
Pt lives with significant other in a house w/ 6 steps to enter with rail.  No steps inside.  Pt has no DME

## 2019-10-14 NOTE — CONSULT NOTE ADULT - ASSESSMENT
78 yo F with hx of Breast cancer, female s/p lumpectomy chemo and radiation, dyslipidemia, DVT of RLE, PE in 2015,HTN, admitted to  for surgical evaluation of right knee. Now underwent right Total Knee replacement      S/P Right Total knee replacement  POD day 0  WBAT  OT/PT  Pain management  Rest of care as per ortho      Extensive Hx of DVT /PE with FHx of DVT  As per discussion with Oncology and Orthopedics. patient is to start ppx 40mg of lovenox POD day 1  POD day 2, please start full dos A/C  Can start first day of 6mg of warfarin tonight and effects will not be seen until 48 hours from tonight    HTN  Restart metoprolol 25mg BID      code  full  diet regular  dvt ppx :scds for now. 80 yo F with hx of Breast cancer, female s/p lumpectomy chemo and radiation, dyslipidemia, DVT of RLE, PE in 2015,HTN, admitted to  for surgical evaluation of right knee. Now underwent right Total Knee replacement      S/P Right Total knee replacement  POD day 0  WBAT  OT/PT  Pain management  Rest of care as per ortho      Extensive Hx of DVT /PE with FHx of DVT  As per discussion with Oncology and Orthopedics. patient is to start ppx 40mg of lovenox POD day 1  POD day 2, please start full dose A/C  Can start first day of 6mg of warfarin tonight and effects will not be seen until 48 hours from tonight    HTN  Restart metoprolol 25mg BID      code  full  diet regular  dvt ppx :scds for now.

## 2019-10-14 NOTE — PHYSICAL THERAPY INITIAL EVALUATION ADULT - GENERAL OBSERVATIONS, REHAB EVAL
pt received semisupine in bed, +IV, +SCDs, +tele pt received semisupine in bed, +IV, +SCDs, +tele, +hemovac, +lethargic

## 2019-10-14 NOTE — PHYSICAL THERAPY INITIAL EVALUATION ADULT - GAIT TRAINING, PT EVAL
Goals 2-4 days, Pt will ambulate 150 ft w/ rolling walker independently Goals 2-4 days, Pt will ambulate 150 ft w/ rolling walker independently   Pt will negotiate 10 steps with rail and straight cane independently

## 2019-10-14 NOTE — PHYSICAL THERAPY INITIAL EVALUATION ADULT - PLANNED THERAPY INTERVENTIONS, PT EVAL
transfer training/gait training/bed mobility training transfer training/bed mobility training/gait training/stair training

## 2019-10-14 NOTE — PHYSICAL THERAPY INITIAL EVALUATION ADULT - GAIT DEVIATIONS NOTED, PT EVAL
decreased velocity of limb motion/decreased step length/decreased stride length/decreased weight-shifting ability/decreased yoana

## 2019-10-15 ENCOUNTER — TRANSCRIPTION ENCOUNTER (OUTPATIENT)
Age: 79
End: 2019-10-15

## 2019-10-15 LAB
ANION GAP SERPL CALC-SCNC: 7 MMOL/L — SIGNIFICANT CHANGE UP (ref 5–17)
APTT BLD: 30.2 SEC — SIGNIFICANT CHANGE UP (ref 28.5–37)
APTT BLD: 32.6 SEC — SIGNIFICANT CHANGE UP (ref 28.5–37)
BUN SERPL-MCNC: 12 MG/DL — SIGNIFICANT CHANGE UP (ref 7–23)
CALCIUM SERPL-MCNC: 8.9 MG/DL — SIGNIFICANT CHANGE UP (ref 8.4–10.5)
CHLORIDE SERPL-SCNC: 102 MMOL/L — SIGNIFICANT CHANGE UP (ref 96–108)
CO2 SERPL-SCNC: 26 MMOL/L — SIGNIFICANT CHANGE UP (ref 22–31)
CREAT SERPL-MCNC: 1.15 MG/DL — SIGNIFICANT CHANGE UP (ref 0.5–1.3)
GLUCOSE SERPL-MCNC: 151 MG/DL — HIGH (ref 70–99)
HCT VFR BLD CALC: 37.7 % — SIGNIFICANT CHANGE UP (ref 34.5–45)
HGB BLD-MCNC: 12.4 G/DL — SIGNIFICANT CHANGE UP (ref 11.5–15.5)
INR BLD: 0.98 RATIO — SIGNIFICANT CHANGE UP (ref 0.88–1.16)
INR BLD: 1.05 RATIO — SIGNIFICANT CHANGE UP (ref 0.88–1.16)
MCHC RBC-ENTMCNC: 31.2 PG — SIGNIFICANT CHANGE UP (ref 27–34)
MCHC RBC-ENTMCNC: 32.9 GM/DL — SIGNIFICANT CHANGE UP (ref 32–36)
MCV RBC AUTO: 94.7 FL — SIGNIFICANT CHANGE UP (ref 80–100)
NRBC # BLD: 0 /100 WBCS — SIGNIFICANT CHANGE UP (ref 0–0)
PLATELET # BLD AUTO: 230 K/UL — SIGNIFICANT CHANGE UP (ref 150–400)
POTASSIUM SERPL-MCNC: 3.6 MMOL/L — SIGNIFICANT CHANGE UP (ref 3.5–5.3)
POTASSIUM SERPL-SCNC: 3.6 MMOL/L — SIGNIFICANT CHANGE UP (ref 3.5–5.3)
PROTHROM AB SERPL-ACNC: 10.7 SEC — SIGNIFICANT CHANGE UP (ref 10–12.9)
PROTHROM AB SERPL-ACNC: 11.5 SEC — SIGNIFICANT CHANGE UP (ref 10–12.9)
RBC # BLD: 3.98 M/UL — SIGNIFICANT CHANGE UP (ref 3.8–5.2)
RBC # FLD: 13.8 % — SIGNIFICANT CHANGE UP (ref 10.3–14.5)
SODIUM SERPL-SCNC: 135 MMOL/L — SIGNIFICANT CHANGE UP (ref 135–145)
WBC # BLD: 8.1 K/UL — SIGNIFICANT CHANGE UP (ref 3.8–10.5)
WBC # FLD AUTO: 8.1 K/UL — SIGNIFICANT CHANGE UP (ref 3.8–10.5)

## 2019-10-15 PROCEDURE — 73562 X-RAY EXAM OF KNEE 3: CPT | Mod: 26,RT

## 2019-10-15 PROCEDURE — 99233 SBSQ HOSP IP/OBS HIGH 50: CPT

## 2019-10-15 RX ORDER — SODIUM CHLORIDE 9 MG/ML
1000 INJECTION, SOLUTION INTRAVENOUS ONCE
Refills: 0 | Status: COMPLETED | OUTPATIENT
Start: 2019-10-15 | End: 2019-10-15

## 2019-10-15 RX ORDER — ENOXAPARIN SODIUM 100 MG/ML
80 INJECTION SUBCUTANEOUS EVERY 12 HOURS
Refills: 0 | Status: DISCONTINUED | OUTPATIENT
Start: 2019-10-16 | End: 2019-10-16

## 2019-10-15 RX ORDER — HYDROMORPHONE HYDROCHLORIDE 2 MG/ML
0.5 INJECTION INTRAMUSCULAR; INTRAVENOUS; SUBCUTANEOUS ONCE
Refills: 0 | Status: DISCONTINUED | OUTPATIENT
Start: 2019-10-15 | End: 2019-10-15

## 2019-10-15 RX ADMIN — PANTOPRAZOLE SODIUM 40 MILLIGRAM(S): 20 TABLET, DELAYED RELEASE ORAL at 05:54

## 2019-10-15 RX ADMIN — Medication 1000 MILLIGRAM(S): at 02:10

## 2019-10-15 RX ADMIN — Medication 400 MILLIGRAM(S): at 08:00

## 2019-10-15 RX ADMIN — Medication 400 MILLIGRAM(S): at 02:08

## 2019-10-15 RX ADMIN — ENOXAPARIN SODIUM 30 MILLIGRAM(S): 100 INJECTION SUBCUTANEOUS at 08:45

## 2019-10-15 RX ADMIN — Medication 1000 MILLIGRAM(S): at 08:15

## 2019-10-15 RX ADMIN — HYDROMORPHONE HYDROCHLORIDE 0.5 MILLIGRAM(S): 2 INJECTION INTRAMUSCULAR; INTRAVENOUS; SUBCUTANEOUS at 16:15

## 2019-10-15 RX ADMIN — Medication 100 MILLIGRAM(S): at 21:23

## 2019-10-15 RX ADMIN — SODIUM CHLORIDE 100 MILLILITER(S): 9 INJECTION, SOLUTION INTRAVENOUS at 08:01

## 2019-10-15 RX ADMIN — Medication 25 MILLIGRAM(S): at 05:54

## 2019-10-15 RX ADMIN — WARFARIN SODIUM 6 MILLIGRAM(S): 2.5 TABLET ORAL at 21:23

## 2019-10-15 RX ADMIN — HYDROMORPHONE HYDROCHLORIDE 0.5 MILLIGRAM(S): 2 INJECTION INTRAMUSCULAR; INTRAVENOUS; SUBCUTANEOUS at 15:58

## 2019-10-15 RX ADMIN — SODIUM CHLORIDE 1000 MILLILITER(S): 9 INJECTION, SOLUTION INTRAVENOUS at 10:25

## 2019-10-15 RX ADMIN — Medication 1000 MILLIGRAM(S): at 13:18

## 2019-10-15 RX ADMIN — ENOXAPARIN SODIUM 30 MILLIGRAM(S): 100 INJECTION SUBCUTANEOUS at 21:22

## 2019-10-15 RX ADMIN — SODIUM CHLORIDE 100 MILLILITER(S): 9 INJECTION, SOLUTION INTRAVENOUS at 05:55

## 2019-10-15 RX ADMIN — Medication 100 MILLIGRAM(S): at 05:54

## 2019-10-15 RX ADMIN — Medication 25 MILLIGRAM(S): at 17:26

## 2019-10-15 RX ADMIN — Medication 1000 MILLIGRAM(S): at 13:40

## 2019-10-15 RX ADMIN — Medication 100 MILLIGRAM(S): at 13:18

## 2019-10-15 RX ADMIN — LORATADINE 10 MILLIGRAM(S): 10 TABLET ORAL at 12:28

## 2019-10-15 RX ADMIN — SENNA PLUS 2 TABLET(S): 8.6 TABLET ORAL at 21:23

## 2019-10-15 RX ADMIN — HYDROMORPHONE HYDROCHLORIDE 2 MILLIGRAM(S): 2 INJECTION INTRAMUSCULAR; INTRAVENOUS; SUBCUTANEOUS at 15:55

## 2019-10-15 RX ADMIN — ATORVASTATIN CALCIUM 40 MILLIGRAM(S): 80 TABLET, FILM COATED ORAL at 21:23

## 2019-10-15 RX ADMIN — HYDROMORPHONE HYDROCHLORIDE 2 MILLIGRAM(S): 2 INJECTION INTRAMUSCULAR; INTRAVENOUS; SUBCUTANEOUS at 15:22

## 2019-10-15 NOTE — PROGRESS NOTE ADULT - SUBJECTIVE AND OBJECTIVE BOX
~patient seen and examined at bedside. no acute overnight events. patient reports she feels dizzy. hasnt eaten for 3 days.    one dose of coumdin given yesterday. ppx lovenox to be started today    denies fever chills chest pain shortness of breath leg pain.    Review of Systems:  General:denies fever chills, headache, weakness  HEENT: denies blurry vision,diffculty swallowing,  Cardiovascular: denies chest pain  ,palpitatins  Pulmonary:denies shortness of breath, cough, wheezing  Gastrointestinal: denies abdominal pain, constipation, diarrhra  : denies hematuria, dysuria  Neurological: denies weakness, numbness , tingling, dizziness  skin: denies skin rash  Psychiatrical: denies mood disturbances        Objective:  Vitals  T(C): 36.4 (10-14-19 @ 10:19), Max: 36.4 (10-14-19 @ 10:19)  HR: 75 (10-14-19 @ 13:30) (71 - 75)  BP: 166/79 (10-14-19 @ 13:30) (166/79 - 187/87)  RR: 20 (10-14-19 @ 13:30) (15 - 20)  SpO2: 95% (10-14-19 @ 13:30) (95% - 99%)    Physical Exam:  General: comfortable, no acute distress, well nourished  HEENT: no LAD, trachea midline  Cardiovascular: normal s1s2, no mrg  Pulmonary: CTA Bilaterally, no wheezing , rhonchi  Gastrointestinal: soft non tender non distended, no masses felt  Muscloskeletal: no lower extremity edema.. hemovac in place  Neurological: CN II-12 intact. No focal weakness  Psychiatrical: normal mood, cooperative    Labs:    ~  Labs:                          12.4   8.10  )-----------( 230      ( 15 Oct 2019 07:24 )             37.7     10-15    135  |  102  |  12  ----------------------------<  151<H>  3.6   |  26  |  1.15    Ca    8.9      15 Oct 2019 07:24        PT/INR - ( 14 Oct 2019 10:30 )   PT: 11.7 sec;   INR: 1.07 ratio         PTT - ( 14 Oct 2019 10:30 )  PTT:30.8 sec      Active Medications  MEDICATIONS  (STANDING):  acetaminophen   Tablet .. 1000 milliGRAM(s) Oral every 12 hours  atorvastatin 40 milliGRAM(s) Oral at bedtime  docusate sodium 100 milliGRAM(s) Oral three times a day  enoxaparin Injectable 30 milliGRAM(s) SubCutaneous every 12 hours  lactated ringers. 1000 milliLiter(s) (100 mL/Hr) IV Continuous <Continuous>  loratadine 10 milliGRAM(s) Oral daily  metoprolol tartrate 25 milliGRAM(s) Oral two times a day  pantoprazole    Tablet 40 milliGRAM(s) Oral before breakfast  senna 2 Tablet(s) Oral at bedtime  warfarin 6 milliGRAM(s) Oral once    MEDICATIONS  (PRN):  aluminum hydroxide/magnesium hydroxide/simethicone Suspension 30 milliLiter(s) Oral four times a day PRN Indigestion  HYDROmorphone   Tablet 2 milliGRAM(s) Oral every 3 hours PRN Mild Pain (1 - 3)  HYDROmorphone   Tablet 4 milliGRAM(s) Oral every 3 hours PRN Moderate Pain (4 - 6)  HYDROmorphone  Injectable 0.5 milliGRAM(s) IV Push every 3 hours PRN Severe Pain (7 - 10)  magnesium hydroxide Suspension 30 milliLiter(s) Oral daily PRN Constipation  ondansetron Injectable 4 milliGRAM(s) IV Push every 6 hours PRN Nausea and/or Vomiting  polyethylene glycol 3350 17 Gram(s) Oral daily PRN Constipation

## 2019-10-15 NOTE — DISCHARGE NOTE PROVIDER - NSDCCPTREATMENT_GEN_ALL_CORE_FT
PRINCIPAL PROCEDURE  Procedure: Right total knee replacement  Findings and Treatment: PRINCIPAL PROCEDURE  Procedure: Right total knee replacement  Findings and Treatment: Severe DJD right knee

## 2019-10-15 NOTE — PROGRESS NOTE ADULT - SUBJECTIVE AND OBJECTIVE BOX
Post Op     MADDY RAMIREZ      79y        Female                                                                                                                 T(C): 36.4 (10-15-19 @ 07:52), Max: 36.5 (10-15-19 @ 03:00)  HR: 71 (10-15-19 @ 07:52) (68 - 90)  BP: 129/83 (10-15-19 @ 07:52) (107/66 - 187/87)  RR: 17 (10-15-19 @ 07:52) (12 - 23)  SpO2: 93% (10-15-19 @ 07:52) (93% - 99%)      S/P   right total knee replacement     Patient denies shortness of breath, chest pain, dyspnea, No complaints  Pain is 3 /10    Physical Exam    Extremity: Bilaterally:  No holmon                                           No Cord                                          PAS on b/ l                                           Neurovascular intact                                          Motor intact EHL/FHL                                          Sensation intact                                          Pulses intact DP/PT  	                            hv  in place                                         Calves Soft                                         Dressing Clean / Dry / Intact                                         Capillary refill with 5 seconds                          12.4   8.10  )-----------( 230      ( 15 Oct 2019 07:24 )             37.7       10-15    135  |  102  |  12  ----------------------------<  151<H>  3.6   |  26  |  1.15    Ca    8.9      15 Oct 2019 07:24        A/P  -- S/P total knee replacement     -  Medicine To Follow dosed  Dvt prophylaxis   - DVT prophylaxis PAS lovenox > coumadin bridge  _ consults appreciated   - PT & OT   - Analagesia  - Incentive Spirometry  - Discharge Planning  - Safety Precautions  -  CBC , BMP daily

## 2019-10-15 NOTE — PROGRESS NOTE ADULT - ASSESSMENT
78 yo F with hx of Breast cancer, female s/p lumpectomy chemo and radiation, dyslipidemia, DVT of RLE, PE in 2015,HTN, admitted to  for surgical evaluation of right knee. Now underwent right Total Knee replacement      S/P Right Total knee replacement  POD day 1  WBAT  OT/PT  Pain management  Rest of care as per ortho    dizziness  likely due to dehydration  rebolus fluids    Extensive Hx of DVT /PE with FHx of DVT  As per discussion with Oncology and Orthopedics.  patient is to start ppx 30mg BID of  lovenox today  POD day 2, please start full dose A/C lovenox  as per discussion with dr. wong. ok to give warfarin on POD day 0 as iNR was 1.0  daily iNR  c/w warfarin 6      HTN  Restart metoprolol 25mg BID      code  full  diet regular  dvt ppx :lovenox 30bid today.

## 2019-10-15 NOTE — OCCUPATIONAL THERAPY INITIAL EVALUATION ADULT - ANTICIPATED DISCHARGE DISPOSITION, OT EVAL
home w/ OT/to address tub transfer safety rehabilitation facility/home w/ OT/due to slow progress and requiring assistance of 2 people for bed mobility/transfers

## 2019-10-15 NOTE — DISCHARGE NOTE PROVIDER - NSDCFUSCHEDAPPT_GEN_ALL_CORE_FT
MADDY RAMIREZ ; 10/28/2019 ; [facility]  MADDY RAMIREZ ; 12/17/2019 ; [facility] MADDY RAMIREZ ; 10/28/2019 ; [facility]  MADDY RAMIREZ ; 12/17/2019 ; [facility]

## 2019-10-15 NOTE — DISCHARGE NOTE PROVIDER - CARE PROVIDER_API CALL
Jerson Barger)  Orthopedics  [address]  [place] [address]  Phone: [phone]  Fax: [phone]  Follow Up Time: 2 weeks

## 2019-10-15 NOTE — DISCHARGE NOTE PROVIDER - HOSPITAL COURSE
MADDY RAMIREZ        Admitted on 10-14-19         79y y.o.  Female with history of Primary localized osteoarthritis of right knee        Surgery:   Right total knee arthroplasty        Orthopedic Surgeon:   Dr. LISA Barger        Cammie-operative antibiotic:  ceFAZolin   IVPB:,             Consulted Services : Hospitalist, Physical Therapy, Occupational Therapy        Typical Physical & occupational therapy modalities post Right total knee arthroplasty     were performed including ambulation training, range of motion, ADL's, and transfers.         DVT prophylaxis:  enoxaparin and warfarin             The patient had a clean appearing surgical incision with no sign of surgical site infections and had a stable neuro / vascular exam of the operated extremity.  After progression of mobility guided by the PT/ OT staff,  the patient was felt to benefit from further rehabilitative care for restoration to level of function. This was felt to best be accomplished in a Rehab facility.        Discharge and Orthopedic Care instructions were delineated in the Discharge Plan and reviewed with the patient. All medications were delineated in the medication reconciliation tool and key points were reviewed with the patient.         This patient was deemed stable from an Orthopedic & medical standpoint for discharge today.    She will follow up with Dr. LISA Barger for further Orthopedic care.

## 2019-10-15 NOTE — DISCHARGE NOTE PROVIDER - NSDCACTIVITY_GEN_ALL_CORE
Walking - Outdoors allowed/Stairs allowed/Walking - Indoors allowed/No heavy lifting/straining/Do not drive or operate machinery

## 2019-10-15 NOTE — OCCUPATIONAL THERAPY INITIAL EVALUATION ADULT - ADDITIONAL COMMENTS
Pt lives in a private home with her significant other. [address] + railing no steps inside the home. +tub with curtain. Pt owns a RTS.

## 2019-10-15 NOTE — DISCHARGE NOTE PROVIDER - INSTRUCTIONS
Regular diet Regular diet  Pain medicine has been prescribed for you, as needed, and it often causes constipation.    For Constipation :   • Increase your water intake. Drink at least 8 glasses of water daily.  • Try adding fiber to your diet by eating fruits, vegetables and foods that are rich in grains.  • If you do experience constipation, you may take an over-the-counter stool softener/laxative such as Colace, Senokot or  Milk of Magnesia.

## 2019-10-15 NOTE — DISCHARGE NOTE PROVIDER - NSDCCPCAREPLAN_GEN_ALL_CORE_FT
PRINCIPAL DISCHARGE DIAGNOSIS  Diagnosis: Primary localized osteoarthritis of right knee  Assessment and Plan of Treatment: Physical Therapy/Occupational Therapy for ambulation, transfers, stairs, ADL's, Range of Motion Exercises, Isometrics.  Full weight bearing as tolerated with rolling walker  Range of Motion Goals: Flexion 120 degrees; Extension 0 degrees  Keep incision clean and dry.  Suture/prineo dressing removal 14 days after surgery at rehab facility or Surgeon's office  May shower post-op day #5 if no drainage from incision  Follow up with your primary care physician within 2-3 weeks of discharge home

## 2019-10-16 ENCOUNTER — TRANSCRIPTION ENCOUNTER (OUTPATIENT)
Age: 79
End: 2019-10-16

## 2019-10-16 VITALS
HEART RATE: 79 BPM | SYSTOLIC BLOOD PRESSURE: 145 MMHG | TEMPERATURE: 98 F | DIASTOLIC BLOOD PRESSURE: 84 MMHG | OXYGEN SATURATION: 94 % | RESPIRATION RATE: 16 BRPM

## 2019-10-16 LAB
ANION GAP SERPL CALC-SCNC: 6 MMOL/L — SIGNIFICANT CHANGE UP (ref 5–17)
BUN SERPL-MCNC: 15 MG/DL — SIGNIFICANT CHANGE UP (ref 7–23)
CALCIUM SERPL-MCNC: 9.3 MG/DL — SIGNIFICANT CHANGE UP (ref 8.4–10.5)
CHLORIDE SERPL-SCNC: 106 MMOL/L — SIGNIFICANT CHANGE UP (ref 96–108)
CO2 SERPL-SCNC: 28 MMOL/L — SIGNIFICANT CHANGE UP (ref 22–31)
CREAT SERPL-MCNC: 0.94 MG/DL — SIGNIFICANT CHANGE UP (ref 0.5–1.3)
GLUCOSE SERPL-MCNC: 95 MG/DL — SIGNIFICANT CHANGE UP (ref 70–99)
HCT VFR BLD CALC: 32.5 % — LOW (ref 34.5–45)
HGB BLD-MCNC: 11.1 G/DL — LOW (ref 11.5–15.5)
INR BLD: 1.36 RATIO — HIGH (ref 0.88–1.16)
MCHC RBC-ENTMCNC: 31.4 PG — SIGNIFICANT CHANGE UP (ref 27–34)
MCHC RBC-ENTMCNC: 34.2 GM/DL — SIGNIFICANT CHANGE UP (ref 32–36)
MCV RBC AUTO: 92.1 FL — SIGNIFICANT CHANGE UP (ref 80–100)
NRBC # BLD: 0 /100 WBCS — SIGNIFICANT CHANGE UP (ref 0–0)
PLATELET # BLD AUTO: 211 K/UL — SIGNIFICANT CHANGE UP (ref 150–400)
POTASSIUM SERPL-MCNC: 3.6 MMOL/L — SIGNIFICANT CHANGE UP (ref 3.5–5.3)
POTASSIUM SERPL-SCNC: 3.6 MMOL/L — SIGNIFICANT CHANGE UP (ref 3.5–5.3)
PROTHROM AB SERPL-ACNC: 15 SEC — HIGH (ref 10–12.9)
RBC # BLD: 3.53 M/UL — LOW (ref 3.8–5.2)
RBC # FLD: 14 % — SIGNIFICANT CHANGE UP (ref 10.3–14.5)
SODIUM SERPL-SCNC: 140 MMOL/L — SIGNIFICANT CHANGE UP (ref 135–145)
WBC # BLD: 6.25 K/UL — SIGNIFICANT CHANGE UP (ref 3.8–10.5)
WBC # FLD AUTO: 6.25 K/UL — SIGNIFICANT CHANGE UP (ref 3.8–10.5)

## 2019-10-16 RX ORDER — DOCUSATE SODIUM 100 MG
1 CAPSULE ORAL
Qty: 0 | Refills: 0 | DISCHARGE
Start: 2019-10-16

## 2019-10-16 RX ORDER — OMEPRAZOLE 10 MG/1
1 CAPSULE, DELAYED RELEASE ORAL
Qty: 0 | Refills: 0 | DISCHARGE

## 2019-10-16 RX ORDER — SENNA PLUS 8.6 MG/1
2 TABLET ORAL
Qty: 0 | Refills: 0 | DISCHARGE
Start: 2019-10-16

## 2019-10-16 RX ORDER — ENOXAPARIN SODIUM 100 MG/ML
80 INJECTION SUBCUTANEOUS
Qty: 0 | Refills: 0 | DISCHARGE
Start: 2019-10-16

## 2019-10-16 RX ORDER — ACETAMINOPHEN 500 MG
2 TABLET ORAL
Qty: 0 | Refills: 0 | DISCHARGE

## 2019-10-16 RX ORDER — POLYETHYLENE GLYCOL 3350 17 G/17G
17 POWDER, FOR SOLUTION ORAL
Qty: 0 | Refills: 0 | DISCHARGE
Start: 2019-10-16

## 2019-10-16 RX ORDER — HYDROMORPHONE HYDROCHLORIDE 2 MG/ML
1 INJECTION INTRAMUSCULAR; INTRAVENOUS; SUBCUTANEOUS
Qty: 0 | Refills: 0 | DISCHARGE
Start: 2019-10-16

## 2019-10-16 RX ORDER — PANTOPRAZOLE SODIUM 20 MG/1
1 TABLET, DELAYED RELEASE ORAL
Qty: 0 | Refills: 0 | DISCHARGE
Start: 2019-10-16

## 2019-10-16 RX ORDER — ACETAMINOPHEN 500 MG
2 TABLET ORAL
Qty: 0 | Refills: 0 | DISCHARGE
Start: 2019-10-16 | End: 2019-10-29

## 2019-10-16 RX ADMIN — HYDROMORPHONE HYDROCHLORIDE 2 MILLIGRAM(S): 2 INJECTION INTRAMUSCULAR; INTRAVENOUS; SUBCUTANEOUS at 13:35

## 2019-10-16 RX ADMIN — SODIUM CHLORIDE 100 MILLILITER(S): 9 INJECTION, SOLUTION INTRAVENOUS at 00:45

## 2019-10-16 RX ADMIN — HYDROMORPHONE HYDROCHLORIDE 2 MILLIGRAM(S): 2 INJECTION INTRAMUSCULAR; INTRAVENOUS; SUBCUTANEOUS at 10:06

## 2019-10-16 RX ADMIN — Medication 100 MILLIGRAM(S): at 13:07

## 2019-10-16 RX ADMIN — HYDROMORPHONE HYDROCHLORIDE 2 MILLIGRAM(S): 2 INJECTION INTRAMUSCULAR; INTRAVENOUS; SUBCUTANEOUS at 05:20

## 2019-10-16 RX ADMIN — Medication 25 MILLIGRAM(S): at 05:20

## 2019-10-16 RX ADMIN — ONDANSETRON 4 MILLIGRAM(S): 8 TABLET, FILM COATED ORAL at 09:36

## 2019-10-16 RX ADMIN — LORATADINE 10 MILLIGRAM(S): 10 TABLET ORAL at 13:05

## 2019-10-16 RX ADMIN — HYDROMORPHONE HYDROCHLORIDE 2 MILLIGRAM(S): 2 INJECTION INTRAMUSCULAR; INTRAVENOUS; SUBCUTANEOUS at 09:36

## 2019-10-16 RX ADMIN — HYDROMORPHONE HYDROCHLORIDE 2 MILLIGRAM(S): 2 INJECTION INTRAMUSCULAR; INTRAVENOUS; SUBCUTANEOUS at 06:00

## 2019-10-16 RX ADMIN — PANTOPRAZOLE SODIUM 40 MILLIGRAM(S): 20 TABLET, DELAYED RELEASE ORAL at 05:21

## 2019-10-16 RX ADMIN — Medication 1000 MILLIGRAM(S): at 00:43

## 2019-10-16 RX ADMIN — HYDROMORPHONE HYDROCHLORIDE 2 MILLIGRAM(S): 2 INJECTION INTRAMUSCULAR; INTRAVENOUS; SUBCUTANEOUS at 13:05

## 2019-10-16 RX ADMIN — ENOXAPARIN SODIUM 80 MILLIGRAM(S): 100 INJECTION SUBCUTANEOUS at 09:36

## 2019-10-16 RX ADMIN — Medication 1000 MILLIGRAM(S): at 01:15

## 2019-10-16 RX ADMIN — Medication 100 MILLIGRAM(S): at 05:20

## 2019-10-16 NOTE — PROGRESS NOTE ADULT - ASSESSMENT
80 yo F with hx of Breast cancer, female s/p lumpectomy chemo and radiation, dyslipidemia, DVT of RLE, PE in 2015,HTN, admitted to  for surgical evaluation of right knee. Now underwent right Total Knee replacement POD #2      S/P Right Total knee replacement  POD day 2  WBAT  [facility]  Pain management  Rest of care as per ortho  To be discharged today to [facility]    dizziness  Resolved    Extensive Hx of DVT /PE with FHx of DVT  As per discussion with Oncology and Orthopedics.  patient is to start ppx 30mg BID of  lovenox today  POD day 2, please start full dose A/C lovenox  as per discussion with dr. wong. ok to give warfarin on POD day 0 as iNR was 1.0  daily iNR  c/w warfarin 6  pt to be bridged at [facility] with lovenox and Coumadin.      HTN  Continue metoprolol 25mg BID    code  full  diet regular  dvt ppx :Full dose lovenox and Coumadin

## 2019-10-16 NOTE — PHARMACOTHERAPY INTERVENTION NOTE - COMMENTS
Transition of Care education at bedside - medication calendar given to patient
Admission medication reconciliation POD1
Presurgical evaluation for postoperative medication management. Team emailed. Note placed in [place].

## 2019-10-16 NOTE — PROGRESS NOTE ADULT - SUBJECTIVE AND OBJECTIVE BOX
POD  #:  2  S/P  Right TKR                       SUBJECTIVE: Patient seen and examined. Resting in bed.  Comfortable now, but reports she had a difficult night secondary to pain.  She is also a little bit nauseous from medication.  Reported Pain Score = 2    OBJECTIVE:     Vital Signs Last 24 Hrs  T(C): 36.8 (16 Oct 2019 07:53), Max: 36.8 (16 Oct 2019 07:53)  T(F): 98.2 (16 Oct 2019 07:53), Max: 98.2 (16 Oct 2019 07:53)  HR: 75 (16 Oct 2019 07:53) (75 - 93)  BP: 130/82 (16 Oct 2019 07:53) (120/74 - 154/80)  RR: 16 (16 Oct 2019 07:53) (16 - 17)  SpO2: 91% (16 Oct 2019 07:53) (91% - 97%)    Right Knee:          Dressing removed: incision clean/dry/intact, prineo tape in place. Hemovac drain in place = 20cc overnight  Bilateral LEs:         Sensation:  intact to light touch          Motor exam:  5/5 dorsiflexion/plantarflexion/EHL          2+ DP pulses          calf supple, NT         SCDs in place    LABS:                        11.1   6.25  )-----------( 211      ( 16 Oct 2019 07:15 )             32.5     10-16    140  |  106  |  15  ----------------------------<  95  3.6   |  28  |  0.94    Ca    9.3      16 Oct 2019 07:15      PT/INR - ( 16 Oct 2019 07:15 )   PT: 15.0 sec;   INR: 1.36 ratio         PTT - ( 15 Oct 2019 12:15 )  PTT:32.6 sec      MEDICATIONS:  Anticoagulation:  enoxaparin Injectable 80 milliGRAM(s) SubCutaneous every 12 hours      Pain medications:   acetaminophen   Tablet .. 1000 milliGRAM(s) Oral every 12 hours  HYDROmorphone   Tablet 2 milliGRAM(s) Oral every 3 hours PRN  HYDROmorphone   Tablet 4 milliGRAM(s) Oral every 3 hours PRN  HYDROmorphone  Injectable 0.5 milliGRAM(s) IV Push every 3 hours PRN        A/P : Patient stable  s/p Right TKR POD # 2  -    dressing d/c'd, hemovac removed, dressing placed over drain site  -    Pain control (Zofran to be given with pain medication)  -    DVT ppx: Lovenox bridging to Coumadin  -    Weight bearing status: WBAT   -    Physical Therapy  -    Occupational Therapy  -    Discharge plan: rehab today pending medical clearance

## 2019-10-16 NOTE — PROGRESS NOTE ADULT - SUBJECTIVE AND OBJECTIVE BOX
Discharge medication calendar:  (warfarin 6mg Qday as directed preop)  Enoxaparin 80mg SC q12h until INR therapeutic  Warfarin 6mg Qday until INR therapeutic  APAP 1000mg q12h x 2-3 weeks  No NSAID  Narcotic PRN  Docusate 100mg TID while taking narcotic  Miralax, Senna, or Bisacodyl PRN for treatment of constipation

## 2019-10-16 NOTE — PROGRESS NOTE ADULT - SUBJECTIVE AND OBJECTIVE BOX
Patient is a 79y old  Female who presents with a chief complaint of right total knee replacement (15 Oct 2019 12:09)      INTERVAL HPI/OVERNIGHT EVENTS:  Pt doing well today.  Ambulating with ortho.    pt on coumadin for hx of PE and DVT.  We are currently bridging AC therapy right now and will continue in [facility].   to be discharged today    MEDICATIONS  (STANDING):  acetaminophen   Tablet .. 1000 milliGRAM(s) Oral every 12 hours  atorvastatin 40 milliGRAM(s) Oral at bedtime  docusate sodium 100 milliGRAM(s) Oral three times a day  enoxaparin Injectable 80 milliGRAM(s) SubCutaneous every 12 hours  lactated ringers. 1000 milliLiter(s) (100 mL/Hr) IV Continuous <Continuous>  loratadine 10 milliGRAM(s) Oral daily  metoprolol tartrate 25 milliGRAM(s) Oral two times a day  pantoprazole    Tablet 40 milliGRAM(s) Oral before breakfast  senna 2 Tablet(s) Oral at bedtime    MEDICATIONS  (PRN):  aluminum hydroxide/magnesium hydroxide/simethicone Suspension 30 milliLiter(s) Oral four times a day PRN Indigestion  bisacodyl Suppository 10 milliGRAM(s) Rectal daily PRN If no bowel movement by postoperative day #2  HYDROmorphone   Tablet 2 milliGRAM(s) Oral every 3 hours PRN Mild Pain (1 - 3)  HYDROmorphone   Tablet 4 milliGRAM(s) Oral every 3 hours PRN Moderate Pain (4 - 6)  HYDROmorphone  Injectable 0.5 milliGRAM(s) IV Push every 3 hours PRN Severe Pain (7 - 10)  magnesium hydroxide Suspension 30 milliLiter(s) Oral daily PRN Constipation  ondansetron Injectable 4 milliGRAM(s) IV Push every 6 hours PRN Nausea and/or Vomiting  polyethylene glycol 3350 17 Gram(s) Oral daily PRN Constipation      Allergies    Biaxin (Other)  Cipro (Other)  Floxin (Hives; Rash)  sulfa drugs (Other)    Intolerances    Percocet 5/325 (Vomiting; Nausea)      REVIEW OF SYSTEMS:  CONSTITUTIONAL: No fever, weight loss, or fatigue  EYES: No eye pain, visual disturbances, or discharge  ENMT:  No difficulty hearing, tinnitus, vertigo; No sinus or throat pain  NECK: No pain or stiffness  RESPIRATORY: No cough, wheezing, chills or hemoptysis; No shortness of breath  CARDIOVASCULAR: No chest pain, palpitations, lightheadedness, or leg swelling  GASTROINTESTINAL: No abdominal or epigastric pain. No nausea, vomiting, or hematemesis; No diarrhea or constipation. No melena or hematochezia.  GENITOURINARY: No dysuria, frequency, hematuria, or incontinence  NEUROLOGICAL: No headaches, memory loss, vertigo, loss of strength, numbness, or tremors  MUSCULOSKELETAL: Right knee soreness from procedure    Vital Signs Last 24 Hrs  T(C): 36.8 (16 Oct 2019 07:53), Max: 36.8 (16 Oct 2019 07:53)  T(F): 98.2 (16 Oct 2019 07:53), Max: 98.2 (16 Oct 2019 07:53)  HR: 75 (16 Oct 2019 07:53) (75 - 93)  BP: 130/82 (16 Oct 2019 07:53) (120/74 - 154/80)  BP(mean): --  RR: 16 (16 Oct 2019 07:53) (16 - 17)  SpO2: 91% (16 Oct 2019 07:53) (91% - 97%)    PHYSICAL EXAM:  General: comfortable, no acute distress, well nourished  HEENT: no LAD, trachea midline  Cardiovascular: normal s1s2, no mrg  Pulmonary: CTA Bilaterally, no wheezing , rhonchi  Gastrointestinal: soft non tender non distended, no masses felt  Muscloskeletal: no lower extremity edema.  Neurological: CN II-12 intact. No focal weakness  Psychiatrical: normal mood, cooperative    LABS:                        11.1   6.25  )-----------( 211      ( 16 Oct 2019 07:15 )             32.5     16 Oct 2019 07:15    140    |  106    |  15     ----------------------------<  95     3.6     |  28     |  0.94     Ca    9.3        16 Oct 2019 07:15      PT/INR - ( 16 Oct 2019 07:15 )   PT: 15.0 sec;   INR: 1.36 ratio         PTT - ( 15 Oct 2019 12:15 )  PTT:32.6 sec    CAPILLARY BLOOD GLUCOSE          RADIOLOGY & ADDITIONAL TESTS:    Imaging Personally Reviewed:  [ ] YES     Consultant(s) Notes Reviewed:      Care Discussed with Consultants/Other Providers:    Advanced Directives: [ ] DNR  [ ] No feeding tube  [ ] MOLST in chart  [ ] MOLST completed today  [ ] Unknown

## 2019-10-28 ENCOUNTER — APPOINTMENT (OUTPATIENT)
Dept: ORTHOPEDIC SURGERY | Facility: CLINIC | Age: 79
End: 2019-10-28
Payer: MEDICARE

## 2019-10-28 VITALS — DIASTOLIC BLOOD PRESSURE: 76 MMHG | HEART RATE: 62 BPM | SYSTOLIC BLOOD PRESSURE: 131 MMHG

## 2019-10-28 PROCEDURE — 99024 POSTOP FOLLOW-UP VISIT: CPT

## 2019-10-28 PROCEDURE — 73562 X-RAY EXAM OF KNEE 3: CPT | Mod: RT

## 2019-10-31 ENCOUNTER — CHART COPY (OUTPATIENT)
Age: 79
End: 2019-10-31

## 2019-11-12 PROCEDURE — 97530 THERAPEUTIC ACTIVITIES: CPT

## 2019-11-12 PROCEDURE — 97165 OT EVAL LOW COMPLEX 30 MIN: CPT

## 2019-11-12 PROCEDURE — 88311 DECALCIFY TISSUE: CPT

## 2019-11-12 PROCEDURE — 97161 PT EVAL LOW COMPLEX 20 MIN: CPT

## 2019-11-12 PROCEDURE — 80048 BASIC METABOLIC PNL TOTAL CA: CPT

## 2019-11-12 PROCEDURE — C1776: CPT

## 2019-11-12 PROCEDURE — 73562 X-RAY EXAM OF KNEE 3: CPT

## 2019-11-12 PROCEDURE — 36415 COLL VENOUS BLD VENIPUNCTURE: CPT

## 2019-11-12 PROCEDURE — C1889: CPT

## 2019-11-12 PROCEDURE — 85610 PROTHROMBIN TIME: CPT

## 2019-11-12 PROCEDURE — 85018 HEMOGLOBIN: CPT

## 2019-11-12 PROCEDURE — 85014 HEMATOCRIT: CPT

## 2019-11-12 PROCEDURE — 88305 TISSUE EXAM BY PATHOLOGIST: CPT

## 2019-11-12 PROCEDURE — 97110 THERAPEUTIC EXERCISES: CPT

## 2019-11-12 PROCEDURE — 97116 GAIT TRAINING THERAPY: CPT

## 2019-11-12 PROCEDURE — 85730 THROMBOPLASTIN TIME PARTIAL: CPT

## 2019-11-12 PROCEDURE — 97535 SELF CARE MNGMENT TRAINING: CPT

## 2019-11-12 PROCEDURE — C1713: CPT

## 2019-11-12 PROCEDURE — 85027 COMPLETE CBC AUTOMATED: CPT

## 2019-11-13 ENCOUNTER — CHART COPY (OUTPATIENT)
Age: 79
End: 2019-11-13

## 2019-11-13 RX ORDER — HYDROMORPHONE HYDROCHLORIDE 2 MG/1
2 TABLET ORAL
Qty: 40 | Refills: 0 | Status: ACTIVE | COMMUNITY
Start: 2019-10-31 | End: 1900-01-01

## 2019-12-17 ENCOUNTER — APPOINTMENT (OUTPATIENT)
Dept: ORTHOPEDIC SURGERY | Facility: CLINIC | Age: 79
End: 2019-12-17
Payer: MEDICARE

## 2019-12-17 VITALS — HEART RATE: 69 BPM | DIASTOLIC BLOOD PRESSURE: 84 MMHG | SYSTOLIC BLOOD PRESSURE: 147 MMHG

## 2019-12-17 DIAGNOSIS — Z96.651 PRESENCE OF RIGHT ARTIFICIAL KNEE JOINT: ICD-10-CM

## 2019-12-17 PROCEDURE — 73562 X-RAY EXAM OF KNEE 3: CPT | Mod: RT

## 2019-12-17 PROCEDURE — 99024 POSTOP FOLLOW-UP VISIT: CPT

## 2020-03-02 RX ORDER — AMOXICILLIN 500 MG/1
500 CAPSULE ORAL
Qty: 20 | Refills: 4 | Status: ACTIVE | COMMUNITY
Start: 2019-10-28 | End: 1900-01-01

## 2022-04-25 NOTE — H&P PST ADULT - NS PRO FEM REPRO HEALTH SCREEN
Brief Postoperative Note    Patient: Carlee Gomes  YOB: 1937  MRN: [id number]    Date of Procedure: 4/25/2022     Pre-Op Diagnosis: Macular pucker, right eye [H35.371]    Post-Op Diagnosis: Same as preoperative diagnosis.       Procedure(s):  RIGHT VITRECTOMY POSTERIOR 25 GAUGE, LASER, SCAR TISSUE REMOVAL    Surgeon(s):  Usama Amaya MD    Surgical Assistant: None    Anesthesia: MAC     Estimated Blood Loss (mL): Minimal    Complications: None    Specimens: * No specimens in log *     Implants: * No implants in log *    Drains: * No LDAs found *    Findings: Visually significant macular pucker right eye    Electronically Signed by Smita Gold MD on 4/25/2022 at 3:07 PM mammogram

## 2023-10-02 NOTE — H&P PST ADULT - VENOUS THROMBOEMBOLISM FOR WOMEN ONLY
Pt instructed to monitor injection site. Verbalized understanding. Plans to return in 3 weeks for next injection.   (0) indicator not present